# Patient Record
Sex: FEMALE | URBAN - METROPOLITAN AREA
[De-identification: names, ages, dates, MRNs, and addresses within clinical notes are randomized per-mention and may not be internally consistent; named-entity substitution may affect disease eponyms.]

---

## 2022-09-30 ENCOUNTER — OFFICE VISIT (OUTPATIENT)
Dept: URBAN - METROPOLITAN AREA CLINIC 33 | Facility: CLINIC | Age: 27
End: 2022-09-30
Payer: MEDICAID

## 2022-09-30 DIAGNOSIS — H00.12 CHALAZION RIGHT LOWER EYELID: Primary | ICD-10-CM

## 2022-09-30 PROCEDURE — 99203 OFFICE O/P NEW LOW 30 MIN: CPT | Performed by: OPTOMETRIST

## 2022-09-30 NOTE — IMPRESSION/PLAN
Impression: Chalazion right lower eyelid: H00.12. Plan: Chalazion present on RLL since ~1 year. Patient was previously prescribed Doxycycline and it helped but did not resolve condition. Patient has also done warm compresses without success. Patient reports chalazion is stable in size.  Refer to Dr. Dunia Taylor for chalazion removal.

## 2022-10-28 ENCOUNTER — OFFICE VISIT (OUTPATIENT)
Dept: URBAN - METROPOLITAN AREA CLINIC 33 | Facility: CLINIC | Age: 27
End: 2022-10-28
Payer: MEDICAID

## 2022-10-28 DIAGNOSIS — H00.12 CHALAZION RIGHT LOWER EYELID: Primary | ICD-10-CM

## 2022-10-28 PROCEDURE — 67801 REMOVE EYELID LESIONS: CPT | Performed by: OPHTHALMOLOGY

## 2022-10-28 RX ORDER — NEOMYCIN SULFATE, POLYMYXIN B SULFATE AND DEXAMETHASONE 3.5; 10000; 1 MG/ML; [USP'U]/ML; MG/ML
SUSPENSION/ DROPS OPHTHALMIC
Qty: 5 | Refills: 0 | Status: INACTIVE
Start: 2022-10-28 | End: 2022-11-06

## 2022-10-28 ASSESSMENT — INTRAOCULAR PRESSURE
OS: 10
OD: 10

## 2022-10-28 NOTE — IMPRESSION/PLAN
Impression: Chalazion right lower eyelid: H00.12. Plan: Patient examined. Chart reviewed. Patient has signs, symptoms and findings consistent with chronic chalazia. This was diagrammatically described. Patient voiced understanding. Discussed known options for management (do nothing, conservative management, steroid injection where appropriate,  and/or surgical intervention.) Patient elects surgical intervention at this time. Benefits and risks discussed. [id number] [id number]- Excision of chalazion, multiple, same eyelid RX DARREN/POLY/DEX TID OD x 10 days. RV PRN.

## 2022-11-04 ENCOUNTER — POST-OPERATIVE VISIT (OUTPATIENT)
Facility: LOCATION | Age: 27
End: 2022-11-04
Payer: MEDICAID

## 2022-11-04 DIAGNOSIS — Z48.89 ENCOUNTER FOR OTHER SPECIFIED SURGICAL AFTERCARE: Primary | ICD-10-CM

## 2022-11-04 PROCEDURE — 99024 POSTOP FOLLOW-UP VISIT: CPT | Performed by: OPHTHALMOLOGY

## 2022-11-04 ASSESSMENT — INTRAOCULAR PRESSURE
OS: 10
OD: 10

## 2022-11-04 NOTE — IMPRESSION/PLAN
Impression: S/P Lesion excision  - 7 Days. Encounter for other specified surgical aftercare  Z48.89. Post operative instructions reviewed - Plan: Patient examined. Site healing well, no sign of infection.